# Patient Record
Sex: FEMALE | Race: BLACK OR AFRICAN AMERICAN | NOT HISPANIC OR LATINO | Employment: FULL TIME | URBAN - METROPOLITAN AREA
[De-identification: names, ages, dates, MRNs, and addresses within clinical notes are randomized per-mention and may not be internally consistent; named-entity substitution may affect disease eponyms.]

---

## 2023-01-04 ENCOUNTER — HOSPITAL ENCOUNTER (EMERGENCY)
Facility: HOSPITAL | Age: 38
Discharge: HOME | End: 2023-01-04
Attending: EMERGENCY MEDICINE
Payer: COMMERCIAL

## 2023-01-04 VITALS
BODY MASS INDEX: 26.16 KG/M2 | DIASTOLIC BLOOD PRESSURE: 106 MMHG | WEIGHT: 166.69 LBS | HEART RATE: 71 BPM | RESPIRATION RATE: 20 BRPM | HEIGHT: 67 IN | SYSTOLIC BLOOD PRESSURE: 171 MMHG | OXYGEN SATURATION: 100 % | TEMPERATURE: 99 F

## 2023-01-04 DIAGNOSIS — R04.2 HEMOPTYSIS: ICD-10-CM

## 2023-01-04 PROCEDURE — 99283 EMERGENCY DEPT VISIT LOW MDM: CPT | Mod: 25

## 2023-01-04 NOTE — ED PROVIDER NOTES
Encounter Date: 1/4/2023       History     Chief Complaint   Patient presents with    Hemoptysis     Hemoptysis X24hrs, left posterior chest pain, HTN, states she will be seeing PCP for HTN soon     Patient states yesterday she began feeling a postnasal drip that made her clear her throat over and over again and then she realized that was blood in the mucus.  She states that today she had an episode where there was a significantly larger amount of blood that she seemed to cough up.  Denies chest pain, fever, fatigue, weakness, dyspnea.  She also states she is not had any recent URIs that she is aware of.  Patient states that she is been off of antihypertensives for about 1 year because she lost weight and thought that her blood pressure was okay.  For the last week she is had her blood pressure checked several times and realize it is elevated.  She has an appointment to see her primary care physician Friday about this issue.    The history is provided by the patient.   Review of patient's allergies indicates:  No Known Allergies  No past medical history on file.  No past surgical history on file.  No family history on file.     Review of Systems   Constitutional:  Negative for fever.   HENT:  Negative for sore throat.    Respiratory:  Negative for shortness of breath.    Cardiovascular:  Negative for chest pain.   Gastrointestinal:  Negative for nausea.   Genitourinary:  Negative for dysuria.   Musculoskeletal:  Negative for back pain.   Skin:  Negative for rash.   Neurological:  Negative for weakness.   Hematological:  Does not bruise/bleed easily.     Physical Exam     Initial Vitals [01/04/23 1344]   BP Pulse Resp Temp SpO2   (!) 175/116 71 20 98.5 °F (36.9 °C) 100 %      MAP       --         Physical Exam    Nursing note and vitals reviewed.  Constitutional: She appears well-developed and well-nourished. She is not diaphoretic. She is active.  Non-toxic appearance. No distress.   HENT:   Head: Normocephalic and  atraumatic.   Slight left frontal sinus TTP.  No maxillary sinus TTP.  Right nares turbinates are swollen and mucosa is pink no drainage.  No cervical chain or submandibular lymphadenopathy no supraclavicular lymphadenopathy or tenderness.  Right TM is injected no bulging no effusion left TM is clear.   Eyes: Conjunctivae are normal. Right eye exhibits no discharge. Left eye exhibits no discharge. No scleral icterus.   Neck:   Normal range of motion.  Cardiovascular:  Normal rate, regular rhythm and intact distal pulses.           No murmur heard.  Pulmonary/Chest: Breath sounds normal. No respiratory distress. She has no wheezes. She has no rhonchi. She has no rales. She exhibits no tenderness.   Abdominal: She exhibits no distension.   Musculoskeletal:         General: No tenderness. Normal range of motion.      Cervical back: Normal range of motion.     Neurological: She is alert and oriented to person, place, and time. No cranial nerve deficit. GCS score is 15. GCS eye subscore is 4. GCS verbal subscore is 5. GCS motor subscore is 6.   Skin: Skin is warm and dry. Capillary refill takes less than 2 seconds. No rash noted.   Psychiatric: She has a normal mood and affect. Her behavior is normal. Judgment and thought content normal.       ED Course   Procedures  Labs Reviewed - No data to display       Imaging Results              X-Ray Chest 1 View (Final result)  Result time 01/04/23 14:21:58      Final result by PHILLIP Pollock Sr., MD (01/04/23 14:21:58)                   Impression:      Normal study.      Electronically signed by: Domenic Pollock MD  Date:    01/04/2023  Time:    14:21               Narrative:    EXAMINATION:  XR CHEST 1 VIEW    CLINICAL HISTORY:  Hemoptysis    COMPARISON:  None    FINDINGS:  The size of the heart is normal. The lungs are clear. There is no pneumothorax.  The costophrenic angles are sharp.                                       Medications - No data to display  Medical  Decision Making:   The patient is in no distress and appears healthy.  Explained to the patient that should any of her symptoms worse it or any additional symptoms develop she should return to the ER for a larger workup.  Offer to do some lab work to address concerns about her blood pressure elevation patient does have an appointment on Friday with her primary care where she will follow-up.  PERC rule calculation used assuming that this is true hemoptysis and not sinusitis  Additional MDM:   PERC Rule:   Age is greater than or equal to 50 = 0.0  Heart Rate is greater than or equal to 100 = 0.0  SaO2 on room air < 95% = 0.0  Unilateral leg swelling = 0.0  Hemoptysis = 1.0  Recent surgery or trauma = 0.0  Prior PE or DVT =  0.0  Hormone use = 0.00  PERC Score = 1                 I discussed with patient and/or family/caretaker that evaluation in the ED does not suggest any emergent or life threatening medical conditions requiring immediate intervention beyond what was provided in the ED, and I believe patient is safe for discharge.  Regardless, an unremarkable evaluation in the ED does not preclude the development or presence of a serious of life threatening condition. As such, patient was instructed to return immediately for any worsening or change in current symptoms.      Clinical Impression:   Final diagnoses:  [R04.2] Hemoptysis               Les Oreilly NP  01/04/23 [id number]

## 2023-01-04 NOTE — FIRST PROVIDER EVALUATION
"Medical screening examination initiated.  I have conducted a focused provider triage encounter, findings are as follows:    Brief history of present illness:  Patient states that she is coughing up blood.  Denies any URI or other systemic illness    Vitals:    01/04/23 1344   BP: (!) 175/116   BP Location: Right arm   Patient Position: Sitting   Pulse: 71   Resp: 20   Temp: 98.5 °F (36.9 °C)   TempSrc: Oral   SpO2: 100%   Weight: 75.6 kg (166 lb 10.7 oz)   Height: 5' 7" (1.702 m)       Pertinent physical exam:  NAD    Brief workup plan:  X-ray    Preliminary workup initiated; this workup will be continued and followed by the physician or advanced practice provider that is assigned to the patient when roomed.  "

## 2023-01-04 NOTE — Clinical Note
"Jelly Moseleyscar Lepe was seen and treated in our emergency department on 1/4/2023.  She may return to work on 01/05/2023.       If you have any questions or concerns, please don't hesitate to call.      Les Oreilly NP"
actual